# Patient Record
Sex: FEMALE | Race: WHITE | NOT HISPANIC OR LATINO | Employment: OTHER | URBAN - METROPOLITAN AREA
[De-identification: names, ages, dates, MRNs, and addresses within clinical notes are randomized per-mention and may not be internally consistent; named-entity substitution may affect disease eponyms.]

---

## 2023-11-17 ENCOUNTER — HOSPITAL ENCOUNTER (OUTPATIENT)
Dept: GENERAL RADIOLOGY | Facility: HOSPITAL | Age: 78
Discharge: HOME | End: 2023-11-17
Admitting: NEUROLOGICAL SURGERY
Payer: COMMERCIAL

## 2023-11-17 ENCOUNTER — OFFICE VISIT (OUTPATIENT)
Dept: NEUROSURGERY | Facility: CLINIC | Age: 78
End: 2023-11-17
Payer: COMMERCIAL

## 2023-11-17 VITALS — HEIGHT: 62 IN | WEIGHT: 114 LBS | TEMPERATURE: 97.8 F | BODY MASS INDEX: 20.98 KG/M2

## 2023-11-17 DIAGNOSIS — M43.16 SPONDYLOLISTHESIS OF LUMBAR REGION: ICD-10-CM

## 2023-11-17 DIAGNOSIS — M54.16 LUMBAR RADICULOPATHY: Primary | ICD-10-CM

## 2023-11-17 DIAGNOSIS — M51.36 DDD (DEGENERATIVE DISC DISEASE), LUMBAR: ICD-10-CM

## 2023-11-17 DIAGNOSIS — Z72.0 TOBACCO ABUSE: ICD-10-CM

## 2023-11-17 PROCEDURE — 72120 X-RAY BEND ONLY L-S SPINE: CPT

## 2023-11-17 PROCEDURE — 99204 OFFICE O/P NEW MOD 45 MIN: CPT | Performed by: NEUROLOGICAL SURGERY

## 2023-11-17 RX ORDER — LEVOTHYROXINE SODIUM 0.15 MG/1
1 TABLET ORAL DAILY
COMMUNITY

## 2023-11-17 RX ORDER — GABAPENTIN 300 MG/1
300 CAPSULE ORAL TAKE AS DIRECTED
Qty: 90 CAPSULE | Refills: 1 | Status: SHIPPED | OUTPATIENT
Start: 2023-11-17

## 2023-11-17 RX ORDER — ACETAMINOPHEN 500 MG
500 TABLET ORAL EVERY 6 HOURS PRN
COMMUNITY

## 2023-11-17 RX ORDER — NITROFURANTOIN 25; 75 MG/1; MG/1
CAPSULE ORAL
COMMUNITY
Start: 2023-11-14

## 2023-11-17 RX ORDER — HYDROCODONE BITARTRATE AND ACETAMINOPHEN 7.5; 325 MG/1; MG/1
TABLET ORAL
COMMUNITY
Start: 2023-10-13 | End: 2023-11-17 | Stop reason: SDUPTHER

## 2023-11-17 RX ORDER — ESTERIFIED ESTROGEN AND METHYLTESTOSTERONE .625; 1.25 MG/1; MG/1
TABLET ORAL
COMMUNITY
Start: 2023-06-05

## 2023-11-17 RX ORDER — EZETIMIBE 10 MG/1
TABLET ORAL
COMMUNITY
Start: 2023-10-31

## 2023-11-17 RX ORDER — NAPROXEN 500 MG/1
TABLET ORAL
COMMUNITY
Start: 2023-10-12

## 2023-11-17 RX ORDER — ASPIRIN 81 MG/1
TABLET, CHEWABLE ORAL
COMMUNITY

## 2023-11-17 RX ORDER — HYDROCODONE BITARTRATE AND ACETAMINOPHEN 7.5; 325 MG/1; MG/1
1 TABLET ORAL 2 TIMES DAILY PRN
Qty: 30 TABLET | Refills: 0 | Status: SHIPPED | OUTPATIENT
Start: 2023-11-17

## 2023-11-17 NOTE — PROGRESS NOTES
Patient: Mavis Gaines  : 1945    Primary Care Provider: Stanley Whitley MD    Requesting Provider: As above        History    Chief Complaint: Low back and left leg pain.    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  Symptoms really began in earnest the next day.  She has pain in her back that extends into the left leg and ankle.  She has had numbness in her foot.  Her symptoms are worse particular with walking in the morning.  She has received a steroid shot and has taken NSAIDs.  She does take Norco at night.  Her lifestyle has been markedly curtailed because of this pain.  She is typically very active.  She smokes about 1/2 pack of tobacco daily.  She has had some right lower back pain but no right leg pain.    Review of Systems   Constitutional:  Positive for activity change and fatigue. Negative for appetite change, chills, diaphoresis, fever and unexpected weight change.   HENT:  Positive for congestion, dental problem, postnasal drip, sneezing and tinnitus. Negative for drooling, ear discharge, ear pain, facial swelling, hearing loss, mouth sores, nosebleeds, rhinorrhea, sinus pressure, sinus pain, sore throat, trouble swallowing and voice change.    Eyes:  Negative for photophobia, pain, discharge, redness, itching and visual disturbance.   Respiratory:  Positive for cough. Negative for apnea, choking, chest tightness, shortness of breath, wheezing and stridor.    Cardiovascular:  Negative for chest pain, palpitations and leg swelling.   Gastrointestinal:  Negative for abdominal distention, abdominal pain, anal bleeding, blood in stool, constipation, diarrhea, nausea, rectal pain and vomiting.   Endocrine: Negative for cold intolerance, heat intolerance, polydipsia, polyphagia and polyuria.   Genitourinary:  Positive for genital sores. Negative for decreased urine volume, difficulty  "urinating, dyspareunia, dysuria, enuresis, flank pain, frequency, hematuria, menstrual problem, pelvic pain, urgency, vaginal bleeding, vaginal discharge and vaginal pain.   Musculoskeletal:  Positive for back pain and gait problem. Negative for arthralgias, joint swelling, myalgias, neck pain and neck stiffness.   Skin:  Negative for color change, pallor, rash and wound.   Allergic/Immunologic: Positive for environmental allergies. Negative for food allergies and immunocompromised state.   Neurological:  Positive for weakness and numbness. Negative for dizziness, tremors, seizures, syncope, facial asymmetry, speech difficulty, light-headedness and headaches.   Hematological:  Negative for adenopathy. Does not bruise/bleed easily.   Psychiatric/Behavioral:  Negative for agitation, behavioral problems, confusion, decreased concentration, dysphoric mood, hallucinations, self-injury, sleep disturbance and suicidal ideas. The patient is not nervous/anxious and is not hyperactive.      The patient's past medical history, past surgical history, family history, and social history have been reviewed at length in the electronic medical record.      Physical Exam:   Temp 97.8 °F (36.6 °C) (Infrared)   Ht 157.5 cm (62\")   Wt 51.7 kg (114 lb)   BMI 20.85 kg/m²   CONSTITUTIONAL: Patient is well-nourished, pleasant and appears stated age.  MUSCULOSKELETAL:  Straight leg raising is negative.  Chad's Sign is negative.  ROM in the low back is normal.  Tenderness in the back to palpation is not observed.  NEUROLOGICAL:  Orientation, memory, attention span, language function, and cognition have been examined and are intact.  Strength is intact in the lower extremities to direct testing.  Muscle tone is normal throughout.  Station and gait are normal.  Sensation is intact to light touch testing throughout.  Deep tendon reflexes are 2+ and symmetrical.  Coordination is intact.      Medical Decision Making    Data Review:   (All " imaging studies were personally reviewed unless stated otherwise)  MRI of the lumbar spine dated 10/21/2023 demonstrates some degenerative disc disease and facet arthropathy.  There is a grade 1 listhesis of L4 on L5 and L5 on S1.  There is generous stenosis at the L4-5 level.  There is some foraminal narrowing on the left at L5-S1 where there are bilateral joint effusions.    Diagnosis:   1.  Left L5 radiculopathy.  2.  Lumbar spondylolisthesis with potential instability.    Treatment Options:   I have prescribed gabapentin which the patient is marginally enthusiastic about.  I have also referred her to physical therapy.  Prior to follow-up in several weeks I am going to check flexion-extension upright lateral lumbar spine x-rays.  If she is not improving then we may need to consider epidural injections.  Should her symptoms drag on then L4-S1 fusion could be a consideration.  I discussed the importance of smoking cessation as it relates to her back and a potential surgical intervention.      Scribed for Aleksey Rodriguez MD by Gwen Brennan CMA on 11/17/2023 12:11 EST       I, Dr. Rodriguez, personally performed the services described in the documentation, as scribed in my presence, and it is both accurate and complete.

## 2024-01-02 ENCOUNTER — OFFICE VISIT (OUTPATIENT)
Dept: NEUROSURGERY | Facility: CLINIC | Age: 79
End: 2024-01-02
Payer: COMMERCIAL

## 2024-01-02 VITALS — BODY MASS INDEX: 21.23 KG/M2 | HEIGHT: 62 IN | TEMPERATURE: 97.3 F | WEIGHT: 115.4 LBS

## 2024-01-02 DIAGNOSIS — M54.16 LUMBAR RADICULOPATHY: Primary | ICD-10-CM

## 2024-01-02 DIAGNOSIS — M51.36 DDD (DEGENERATIVE DISC DISEASE), LUMBAR: ICD-10-CM

## 2024-01-02 DIAGNOSIS — Z72.0 TOBACCO ABUSE: ICD-10-CM

## 2024-01-02 DIAGNOSIS — M43.16 SPONDYLOLISTHESIS OF LUMBAR REGION: ICD-10-CM

## 2024-01-02 PROCEDURE — 99213 OFFICE O/P EST LOW 20 MIN: CPT | Performed by: NEUROLOGICAL SURGERY

## 2024-01-02 RX ORDER — GABAPENTIN 100 MG/1
100 CAPSULE ORAL
Qty: 30 CAPSULE | Refills: 0 | Status: SHIPPED | OUTPATIENT
Start: 2024-01-02

## 2024-01-02 NOTE — PROGRESS NOTES
"Patient: Mavis Gaines  : 1945    Primary Care Provider: Stanley Whitley MD    Requesting Provider: As above        History    Chief Complaint: Low back and left leg pain.    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  In addition to pain Sharber numbness in her left foot.  She was treated with NSAIDs, steroid shot, and Norco.  She smokes 1/2 pack of tobacco daily.  When seen last she was referred to therapy and started on gabapentin.  She reluctantly took gabapentin but could only tolerate a nighttime dose.  Her pain is now absent.  She still has some numbness in her left foot.    Review of Systems    The patient's past medical history, past surgical history, family history, and social history have been reviewed at length in the electronic medical record.      Physical Exam:   Temp 97.3 °F (36.3 °C) (Infrared)   Ht 157.5 cm (62.01\")   Wt 52.3 kg (115 lb 6.4 oz)   BMI 21.10 kg/m²   Straight leg raising is negative.  Strength in her left foot is intact.    Medical Decision Making    Data Review:   (All imaging studies were personally reviewed unless stated otherwise)  MRI of the lumbar spine dated 10/21/2023 demonstrates some degenerative disc disease and facet arthropathy.  There is a grade 1 listhesis of L4 on L5 and L5 on S1.  There is generous stenosis at the L4-5 level.  There is some foraminal narrowing on the left at L5-S1 where there are bilateral joint effusions.     Flexion-extension films demonstrate a low-grade stable offset of L4 on L5.    Diagnosis:   1.  Left L5 radiculopathy, improved.  2.  L4-5 spondylolisthesis.    Treatment Options:   The patient is doing better.  I am going to decrease her gabapentin to 100 mg at night.  I have reordered more physical therapy, 2 times a week for the next 3 weeks.  She will follow-up in my clinic in about 6 weeks to check on " her progress.  If she develops recurrent pain then we will need to either bump up her gabapentin again or consider an epidural injection.      Scribed for Aleksey Rodriguez MD by Madelyn Wilks MA on 1/2/2024 16:05 EST      I, Dr. Rodriguez, personally performed the services described in the documentation, as scribed in my presence, and it is both accurate and complete.

## 2024-02-09 ENCOUNTER — OFFICE VISIT (OUTPATIENT)
Dept: NEUROSURGERY | Facility: CLINIC | Age: 79
End: 2024-02-09
Payer: COMMERCIAL

## 2024-02-09 VITALS — HEIGHT: 62 IN | BODY MASS INDEX: 21.02 KG/M2 | WEIGHT: 114.2 LBS | HEART RATE: 88 BPM | OXYGEN SATURATION: 96 %

## 2024-02-09 DIAGNOSIS — M54.16 LUMBAR RADICULOPATHY: Primary | ICD-10-CM

## 2024-02-09 DIAGNOSIS — M43.16 SPONDYLOLISTHESIS OF LUMBAR REGION: ICD-10-CM

## 2024-02-09 PROCEDURE — 99213 OFFICE O/P EST LOW 20 MIN: CPT | Performed by: NEUROLOGICAL SURGERY

## 2024-02-09 NOTE — PROGRESS NOTES
Patient: Mavis Gaines  : 1945    Primary Care Provider: Stanley Whitley MD    Requesting Provider: As above        History    Chief Complaint: Low back and left leg pain.    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  In addition to pain she harbored numbness in her left foot.  She was treated with NSAIDs, steroid shot, and Norco.  She smokes 1/2 pack of tobacco daily.  When seen last she was referred to therapy and started on gabapentin.  She reluctantly took gabapentin but could only tolerate a nighttime dose.  Her pain is now absent.  There is still numbness in her foot but that seems to be receding.  She ran out of her gabapentin about a week ago but has not noticed any change in her symptoms.    Review of Systems   Constitutional:  Negative for activity change, appetite change, chills, diaphoresis, fatigue, fever and unexpected weight change.   HENT:  Negative for congestion, dental problem, drooling, ear discharge, ear pain, facial swelling, hearing loss, mouth sores, nosebleeds, postnasal drip, rhinorrhea, sinus pressure, sneezing, sore throat, tinnitus, trouble swallowing and voice change.    Eyes:  Negative for photophobia, pain, discharge, redness, itching and visual disturbance.   Respiratory:  Negative for apnea, cough, choking, chest tightness, shortness of breath, wheezing and stridor.    Cardiovascular:  Negative for chest pain, palpitations and leg swelling.   Gastrointestinal:  Negative for abdominal distention, abdominal pain, anal bleeding, blood in stool, constipation, diarrhea, nausea, rectal pain and vomiting.   Endocrine: Negative for cold intolerance, heat intolerance, polydipsia, polyphagia and polyuria.   Genitourinary:  Negative for decreased urine volume, difficulty urinating, dysuria, enuresis, flank pain, frequency, genital sores, hematuria and  "urgency.   Musculoskeletal:  Negative for arthralgias, back pain, gait problem, joint swelling, myalgias, neck pain and neck stiffness.   Skin:  Negative for color change, pallor, rash and wound.   Allergic/Immunologic: Negative for environmental allergies, food allergies and immunocompromised state.   Neurological:  Positive for numbness. Negative for dizziness, tremors, seizures, syncope, facial asymmetry, speech difficulty, weakness, light-headedness and headaches.   Hematological:  Negative for adenopathy. Does not bruise/bleed easily.   Psychiatric/Behavioral:  Negative for agitation, behavioral problems, confusion, decreased concentration, dysphoric mood, hallucinations, self-injury, sleep disturbance and suicidal ideas. The patient is not nervous/anxious and is not hyperactive.        The patient's past medical history, past surgical history, family history, and social history have been reviewed at length in the electronic medical record.      Physical Exam:   Pulse 88   Ht 157.5 cm (62.01\")   Wt 51.8 kg (114 lb 3.2 oz)   SpO2 96%   BMI 20.88 kg/m²   Left foot strength is intact.  Her gait is normal.    Medical Decision Making    Data Review:   (All imaging studies were personally reviewed unless stated otherwise)  MRI of the lumbar spine dated 10/21/2023 demonstrates some degenerative disc disease and facet arthropathy.  There is a grade 1 listhesis of L4 on L5 and L5 on S1.  There is generous stenosis at the L4-5 level.  There is some foraminal narrowing on the left at L5-S1 where there are bilateral joint effusions.      Flexion-extension films demonstrate a low-grade stable offset of L4 on L5.    Diagnosis:   1.  Left L5 radiculopathy, greatly improved.  2.  Stable L4-5 spondylolisthesis.    Treatment Options:   Ms. Gaines is doing better.  Given that she is doing well we will hold off on more gabapentin.  If her symptoms recur then I will be happy to see her in follow-up or refill the gabapentin if " necessary.    Scribed for Aleksey Rodriguez MD by Gwen Brennan CMA on 2/9/2024 14:24 EST         I, Dr. Rodriguez, personally performed the services described in the documentation, as scribed in my presence, and it is both accurate and complete.